# Patient Record
Sex: MALE | Race: WHITE | NOT HISPANIC OR LATINO
[De-identification: names, ages, dates, MRNs, and addresses within clinical notes are randomized per-mention and may not be internally consistent; named-entity substitution may affect disease eponyms.]

---

## 2017-03-23 ENCOUNTER — APPOINTMENT (OUTPATIENT)
Dept: SURGERY | Facility: CLINIC | Age: 52
End: 2017-03-23

## 2017-03-23 VITALS
SYSTOLIC BLOOD PRESSURE: 128 MMHG | HEART RATE: 62 BPM | HEIGHT: 72 IN | BODY MASS INDEX: 27.09 KG/M2 | WEIGHT: 200 LBS | DIASTOLIC BLOOD PRESSURE: 83 MMHG | TEMPERATURE: 98 F | OXYGEN SATURATION: 97 % | RESPIRATION RATE: 16 BRPM

## 2017-03-23 DIAGNOSIS — K40.20 BILATERAL INGUINAL HERNIA, W/OUT OBSTRUCTION OR GANGRENE, NOT SPECIFIED AS RECURRENT: ICD-10-CM

## 2017-03-23 DIAGNOSIS — K40.91 UNILATERAL INGUINAL HERNIA, W/OUT OBSTRUCTION OR GANGRENE, RECURRENT: ICD-10-CM

## 2018-05-03 ENCOUNTER — TRANSCRIPTION ENCOUNTER (OUTPATIENT)
Age: 53
End: 2018-05-03

## 2020-02-29 ENCOUNTER — EMERGENCY (EMERGENCY)
Facility: HOSPITAL | Age: 55
LOS: 0 days | Discharge: HOME | End: 2020-02-29
Attending: EMERGENCY MEDICINE
Payer: COMMERCIAL

## 2020-02-29 VITALS
OXYGEN SATURATION: 97 % | TEMPERATURE: 98 F | HEART RATE: 75 BPM | DIASTOLIC BLOOD PRESSURE: 89 MMHG | RESPIRATION RATE: 15 BRPM | SYSTOLIC BLOOD PRESSURE: 139 MMHG

## 2020-02-29 VITALS — WEIGHT: 205.03 LBS | HEIGHT: 72 IN

## 2020-02-29 DIAGNOSIS — H57.89 OTHER SPECIFIED DISORDERS OF EYE AND ADNEXA: ICD-10-CM

## 2020-02-29 DIAGNOSIS — H10.9 UNSPECIFIED CONJUNCTIVITIS: ICD-10-CM

## 2020-02-29 PROCEDURE — 99283 EMERGENCY DEPT VISIT LOW MDM: CPT

## 2020-02-29 RX ORDER — ERYTHROMYCIN BASE 5 MG/GRAM
1 OINTMENT (GRAM) OPHTHALMIC (EYE)
Qty: 1 | Refills: 0
Start: 2020-02-29 | End: 2020-03-04

## 2020-02-29 NOTE — ED ADULT TRIAGE NOTE - CHIEF COMPLAINT QUOTE
pt c/o discomfort and "redness to eyes," after applying VISINE to his eyes d/t dryness.  Denies double/blurry vision.

## 2020-02-29 NOTE — ED PROVIDER NOTE - OBJECTIVE STATEMENT
54 YOM with no pertinent PMHx presents to the ED c/o b/l eye redness onset x2 days ago. Pt was working in construction site 2 days ago and could have been exposed to something that may have irritated his eyes. +Clear discharge, which was crusty this morning. Denies thick exudate, pain, cough, decrease in vision. No recent travel. Pt is using Visine to treat eyes. Negative Slit-lamp exam. No dye uptake, no sign of corneal abrasion.

## 2020-02-29 NOTE — ED PROVIDER NOTE - NSFOLLOWUPCLINICS_GEN_ALL_ED_FT
[facility]  Ophthalmology  [address] [place] [address] [id number]  [place] [address]  Phone: [phone]  Fax:   Follow Up Time: 7-10 Days

## 2020-02-29 NOTE — ED PROVIDER NOTE - CLINICAL SUMMARY MEDICAL DECISION MAKING FREE TEXT BOX
Slit-lamp exam with fluorescein to r/o corneal abrasion. Topical Abx for suspected conjunctivitis. Follow-up with optometry.

## 2020-02-29 NOTE — ED PROVIDER NOTE - PATIENT PORTAL LINK FT
You can access the FollowMyHealth Patient Portal offered by [facility] by registering at the following website: [email or web address]. By joining [employer]’s FollowMyHealth portal, you will also be able to view your health information using other applications (apps) compatible with our system.

## 2022-06-07 PROBLEM — Z78.9 OTHER SPECIFIED HEALTH STATUS: Chronic | Status: ACTIVE | Noted: 2020-02-29

## 2022-06-13 ENCOUNTER — APPOINTMENT (OUTPATIENT)
Dept: OTOLARYNGOLOGY | Facility: CLINIC | Age: 57
End: 2022-06-13
Payer: MEDICAID

## 2022-06-13 VITALS
WEIGHT: 200 LBS | BODY MASS INDEX: 27.09 KG/M2 | DIASTOLIC BLOOD PRESSURE: 87 MMHG | SYSTOLIC BLOOD PRESSURE: 133 MMHG | HEART RATE: 80 BPM | HEIGHT: 72 IN

## 2022-06-13 DIAGNOSIS — J34.2 DEVIATED NASAL SEPTUM: ICD-10-CM

## 2022-06-13 DIAGNOSIS — J31.0 CHRONIC RHINITIS: ICD-10-CM

## 2022-06-13 DIAGNOSIS — T48.5X5A CHRONIC RHINITIS: ICD-10-CM

## 2022-06-13 DIAGNOSIS — R59.0 LOCALIZED ENLARGED LYMPH NODES: ICD-10-CM

## 2022-06-13 PROCEDURE — 99203 OFFICE O/P NEW LOW 30 MIN: CPT | Mod: 25

## 2022-06-13 PROCEDURE — 31231 NASAL ENDOSCOPY DX: CPT

## 2022-06-13 RX ORDER — PREDNISONE 20 MG/1
20 TABLET ORAL
Qty: 8 | Refills: 0 | Status: ACTIVE | COMMUNITY
Start: 2022-03-05

## 2022-06-13 RX ORDER — CEFDINIR 300 MG/1
300 CAPSULE ORAL
Qty: 20 | Refills: 0 | Status: ACTIVE | COMMUNITY
Start: 2022-03-16

## 2022-06-13 RX ORDER — AMOXICILLIN AND CLAVULANATE POTASSIUM 875; 125 MG/1; MG/1
875-125 TABLET, COATED ORAL
Qty: 20 | Refills: 0 | Status: ACTIVE | COMMUNITY
Start: 2022-03-05

## 2022-06-13 RX ORDER — FLUTICASONE PROPIONATE 50 UG/1
50 SPRAY, METERED NASAL
Qty: 16 | Refills: 0 | Status: ACTIVE | COMMUNITY
Start: 2022-03-17

## 2022-06-13 NOTE — REVIEW OF SYSTEMS
[Sneezing] : sneezing [Seasonal Allergies] : seasonal allergies [Post Nasal Drip] : post nasal drip [Ear Pain] : ear pain [Ear Itch] : ear itch [Vertigo] : vertigo [Nasal Congestion] : nasal congestion [Recurrent Sinus Infections] : recurrent sinus infections [Sinus Pain] : sinus pain [Sinus Pressure] : sinus pressure [Snoring With Pauses] : snoring with pauses [Negative] : Heme/Lymph [FreeTextEntry9] : muscle ache (after work out ) [de-identified] : hives

## 2022-06-13 NOTE — ASSESSMENT
[FreeTextEntry1] : Camilo Brunson presents for evaluation of chronic left nasal obstruction. He has significant leftward septal deviation on endoscopy. However, he also notes chronic afrin use with evidence of rhinitis medicamentosa on endoscopy. We will stop afrin and start topical nasal regimen as below. He has a mobile right cervical lymph node that has been present for years.. Flexible laryngoscopy shows no evidence of mass or lesion.\par \par - STOP afrin.\par - Start nasal saline sprays TID.\par - Start flonase 2 sprays BID.\par - US head and neck\par - Follow up in 1 month.

## 2022-06-13 NOTE — PHYSICAL EXAM
[Nasal Endoscopy Performed] : nasal endoscopy was performed, see procedure section for findings [] : septum deviated to the left [Midline] : trachea located in midline position [Normal] : no rashes [de-identified] : Significant congestion bilaterally. [Laryngoscopy Performed] : laryngoscopy was performed, see procedure section for findings [de-identified] : Enlarged 1.5 cm right level 2 lymph node, mobile, well circumscribed.

## 2022-06-13 NOTE — HISTORY OF PRESENT ILLNESS
[de-identified] : Camilo Brunson is a 57 yo male who presents for evaluation of left nasal obstruction. He notes that this has been present his whole life. He denies any preceding nasal trauma or fracture. He notes some rhinorrhea but denies postnasal drainage. He notes intermittent sinus pressure. He notes history of sinus infections, about twice per year. He denies chronic symptoms at baseline. He denies fevers, chills, vision changes. He notes history of allergy testing that was negative. He notes sneezing but denies epiphora. He uses afrin daily. He denies any history of nasal surgery.\par \par He notes intermittent bilateral aural discomfort. He denies otorrhea, tinnitus, or vertigo. He notes intermittent dysequilibrium. He denies hearing change. He also notes an enlarged right neck lymph node that is not painful and has been present for years. He denies change in size.

## 2022-06-16 ENCOUNTER — NON-APPOINTMENT (OUTPATIENT)
Age: 57
End: 2022-06-16

## 2022-06-23 DIAGNOSIS — R22.1 LOCALIZED SWELLING, MASS AND LUMP, NECK: ICD-10-CM

## 2023-01-09 ENCOUNTER — EMERGENCY (EMERGENCY)
Facility: HOSPITAL | Age: 58
LOS: 0 days | Discharge: HOME | End: 2023-01-10
Attending: EMERGENCY MEDICINE | Admitting: EMERGENCY MEDICINE
Payer: MEDICAID
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
## 2023-01-09 DIAGNOSIS — K13.0 DISEASES OF LIPS: ICD-10-CM

## 2023-01-09 DIAGNOSIS — Z91.010 ALLERGY TO PEANUTS: ICD-10-CM

## 2023-01-09 DIAGNOSIS — T78.3XXA ANGIONEUROTIC EDEMA, INITIAL ENCOUNTER: ICD-10-CM
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
## 2023-01-09 PROCEDURE — 99291 CRITICAL CARE FIRST HOUR: CPT

## 2023-01-10 VITALS
TEMPERATURE: 98 F | OXYGEN SATURATION: 98 % | HEART RATE: 77 BPM | DIASTOLIC BLOOD PRESSURE: 106 MMHG | RESPIRATION RATE: 18 BRPM | WEIGHT: 199.96 LBS | SYSTOLIC BLOOD PRESSURE: 170 MMHG

## 2023-01-10 LAB
ALBUMIN SERPL ELPH-MCNC: 4.7 G/DL — SIGNIFICANT CHANGE UP (ref 3.5–5.2)
ALP SERPL-CCNC: 82 U/L — SIGNIFICANT CHANGE UP (ref 30–115)
ALT FLD-CCNC: 15 U/L — SIGNIFICANT CHANGE UP (ref 0–41)
ANION GAP SERPL CALC-SCNC: 9 MMOL/L — SIGNIFICANT CHANGE UP (ref 7–14)
AST SERPL-CCNC: 22 U/L — SIGNIFICANT CHANGE UP (ref 0–41)
BASOPHILS # BLD AUTO: 0.02 K/UL — SIGNIFICANT CHANGE UP (ref 0–0.2)
BASOPHILS NFR BLD AUTO: 0.4 % — SIGNIFICANT CHANGE UP (ref 0–1)
BILIRUB SERPL-MCNC: 0.5 MG/DL — SIGNIFICANT CHANGE UP (ref 0.2–1.2)
BUN SERPL-MCNC: 18 MG/DL — SIGNIFICANT CHANGE UP (ref 10–20)
CALCIUM SERPL-MCNC: 9.3 MG/DL — SIGNIFICANT CHANGE UP (ref 8.4–10.5)
CHLORIDE SERPL-SCNC: 104 MMOL/L — SIGNIFICANT CHANGE UP (ref 98–110)
CO2 SERPL-SCNC: 27 MMOL/L — SIGNIFICANT CHANGE UP (ref 17–32)
CREAT SERPL-MCNC: 1 MG/DL — SIGNIFICANT CHANGE UP (ref 0.7–1.5)
EGFR: 87 ML/MIN/1.73M2 — SIGNIFICANT CHANGE UP
EOSINOPHIL # BLD AUTO: 0.12 K/UL — SIGNIFICANT CHANGE UP (ref 0–0.7)
EOSINOPHIL NFR BLD AUTO: 2.6 % — SIGNIFICANT CHANGE UP (ref 0–8)
GLUCOSE SERPL-MCNC: 94 MG/DL — SIGNIFICANT CHANGE UP (ref 70–99)
HCT VFR BLD CALC: 42.7 % — SIGNIFICANT CHANGE UP (ref 42–52)
HGB BLD-MCNC: 14.8 G/DL — SIGNIFICANT CHANGE UP (ref 14–18)
IMM GRANULOCYTES NFR BLD AUTO: 0.4 % — HIGH (ref 0.1–0.3)
LYMPHOCYTES # BLD AUTO: 1.37 K/UL — SIGNIFICANT CHANGE UP (ref 1.2–3.4)
LYMPHOCYTES # BLD AUTO: 29.3 % — SIGNIFICANT CHANGE UP (ref 20.5–51.1)
MAGNESIUM SERPL-MCNC: 2 MG/DL — SIGNIFICANT CHANGE UP (ref 1.8–2.4)
MCHC RBC-ENTMCNC: 27 PG — SIGNIFICANT CHANGE UP (ref 27–31)
MCHC RBC-ENTMCNC: 34.7 G/DL — SIGNIFICANT CHANGE UP (ref 32–37)
MCV RBC AUTO: 77.8 FL — LOW (ref 80–94)
MONOCYTES # BLD AUTO: 0.53 K/UL — SIGNIFICANT CHANGE UP (ref 0.1–0.6)
MONOCYTES NFR BLD AUTO: 11.3 % — HIGH (ref 1.7–9.3)
NEUTROPHILS # BLD AUTO: 2.62 K/UL — SIGNIFICANT CHANGE UP (ref 1.4–6.5)
NEUTROPHILS NFR BLD AUTO: 56 % — SIGNIFICANT CHANGE UP (ref 42.2–75.2)
NRBC # BLD: 0 /100 WBCS — SIGNIFICANT CHANGE UP (ref 0–0)
PLATELET # BLD AUTO: 180 K/UL — SIGNIFICANT CHANGE UP (ref 130–400)
POTASSIUM SERPL-MCNC: 4.1 MMOL/L — SIGNIFICANT CHANGE UP (ref 3.5–5)
POTASSIUM SERPL-SCNC: 4.1 MMOL/L — SIGNIFICANT CHANGE UP (ref 3.5–5)
PROT SERPL-MCNC: 6.9 G/DL — SIGNIFICANT CHANGE UP (ref 6–8)
RBC # BLD: 5.49 M/UL — SIGNIFICANT CHANGE UP (ref 4.7–6.1)
RBC # FLD: 12.4 % — SIGNIFICANT CHANGE UP (ref 11.5–14.5)
SODIUM SERPL-SCNC: 140 MMOL/L — SIGNIFICANT CHANGE UP (ref 135–146)
WBC # BLD: 4.68 K/UL — LOW (ref 4.8–10.8)
WBC # FLD AUTO: 4.68 K/UL — LOW (ref 4.8–10.8)

## 2023-01-10 PROCEDURE — 93010 ELECTROCARDIOGRAM REPORT: CPT

## 2023-01-10 RX ORDER — SODIUM CHLORIDE 9 MG/ML
1000 INJECTION, SOLUTION INTRAVENOUS ONCE
Refills: 0 | Status: COMPLETED | OUTPATIENT
Start: 2023-01-10 | End: 2023-01-10

## 2023-01-10 RX ORDER — DIPHENHYDRAMINE HCL 50 MG
50 CAPSULE ORAL ONCE
Refills: 0 | Status: COMPLETED | OUTPATIENT
Start: 2023-01-10 | End: 2023-01-10

## 2023-01-10 RX ORDER — DEXAMETHASONE 0.5 MG/5ML
10 ELIXIR ORAL ONCE
Refills: 0 | Status: COMPLETED | OUTPATIENT
Start: 2023-01-10 | End: 2023-01-10

## 2023-01-10 RX ADMIN — Medication 50 MILLIGRAM(S): at 00:32

## 2023-01-10 RX ADMIN — SODIUM CHLORIDE 1000 MILLILITER(S): 9 INJECTION, SOLUTION INTRAVENOUS at 00:32

## 2023-01-10 RX ADMIN — Medication 10 MILLIGRAM(S): at 00:32

## 2023-01-10 NOTE — ED PROVIDER NOTE - ATTENDING CONTRIBUTION TO CARE
pt w angioedema of lips.   given anti allergic tx in ED  no throat complaints, no airway compromise. no stridor. lungs clear. no ab pain/tender/distension.    will need close observation and supportive care

## 2023-01-10 NOTE — ED PROVIDER NOTE - PHYSICAL EXAMINATION
CONSTITUTIONAL: in no acute distress, afebrile  SKIN: Warm, dry  HEAD: lower lip swelling, localized to lip, no gingiva or neck or face involvement, no signs of airway compromise, uvula midline, bleeding, no TTP or swelling to floor of mouth  EYES: No conjunctival injection. EOMI. PERRLA  ENT: No nasal discharge; oropharynx nonerythematous; airway clear  NECK: Supple; non tender  CARD:  Regular rate and rhythm  RESP: CTAB; No wheezes, crackles, rales or rhonchi  ABD: Soft NTND; No guarding or rebound tenderness  EXT: Normal ROM.  No clubbing or cyanosis.  No edema. No calf tenderness  NEURO: A&O x3, grossly unremarkable, no focal deficits

## 2023-01-10 NOTE — ED PROVIDER NOTE - PROGRESS NOTE DETAILS
AH - Labs unremarkable, patient reassessed.  Patient stating he is feeling a little bit better, swelling slightly improved.  No signs of airway compromise.  Vital signs within normal limits.  Will reassess.

## 2023-01-10 NOTE — ED PROVIDER NOTE - PATIENT PORTAL LINK FT
You can access the FollowMyHealth Patient Portal offered by [facility] by registering at the following website: [email or web address]. By joining [employer]’s FollowMyHealth portal, you will also be able to view your health information using other applications (apps) compatible with our system.

## 2023-01-10 NOTE — ED PROVIDER NOTE - OBJECTIVE STATEMENT
57-year-old male with no significant PMH who presents with lip swelling since last night.  Worsening.  Patient had a similar episode 2 years ago, improved with steroids.  Patient does not take any medications, no ACE inhibitor's.  Patient thinks he is allergic to peanuts.  No peanut ingestion or exposure that he knows of.  Patient did not eat or drink anything different or unusual.  Patient has not tried any medication for relief.  Patient denies any fevers, chills, difficulty breathing, difficulty swallowing, nausea, vomiting, diarrhea, abdominal pain, neck pain, hearing changes, headache, focal weakness, dental procedures.

## 2023-01-10 NOTE — ED PROVIDER NOTE - NSFOLLOWUPINSTRUCTIONS_ED_ALL_ED_FT
- Please follow up with your Primary Care Physician  - You may take antihistamines as needed      Angioedema  Angioedema is the sudden swelling of tissue in the body. Angioedema can affect any part of the body, but it most often affects the deeper parts of the skin, causing red, itchy patches (hives) to appear over the affected area. It often begins during the night and is found in the morning.    Depending on the cause, angioedema may happen:    Only once.  Several times. It may come back in unpredictable patterns.  Repeatedly for several years. Over time, it may gradually stop coming back.    Angioedema can be life-threatening if it affects the air passages that you breathe through.    What are the causes?  This condition may be caused by:    Foods, such as milk, eggs, shellfish, wheat, or nuts.  Certain medicines, such as ACE inhibitors, antibiotics, nonsteroidal anti-inflammatory drugs, birth control pills, or dyes used in X-rays.  Insect stings.  Infections.    Angioedema can be inherited, and episodes can be triggered by:    Mild injury.  Dental work.  Surgery.  Stress.  Sudden changes in temperature.  Exercise.    In some cases, the cause of this condition is not known.    What are the signs or symptoms?  ImageSymptoms of this condition depend on where the swelling happens. Symptoms may include:    Swollen skin.  Red, itchy patches of skin (hives).  Redness in the affected area.  Pain in the affected area.  Swollen lips or tongue.  Wheezing.  Breathing problems.    If your internal organs are involved, symptoms may also include:    Nausea.  Abdominal pain.  Vomiting.  Difficulty swallowing.  Difficulty passing urine.    How is this diagnosed?  This condition may be diagnosed based on:    An exam of the affected area.  Your medical history.  Whether anyone in your family has had this condition before.  A review of any medicines you have been taking.  Tests, including:    Allergy skin tests to see if the condition was caused by an allergic reaction.  Blood tests to see if the condition was caused by a gene.  Tests to check for underlying diseases that could cause the condition.      How is this treated?  Treatment for this condition depends on the cause. It may involve any of the following:    If something triggered the condition, making changes to keep it from triggering the condition again.  If the condition affects your breathing, having tubes placed in your airway to keep it open.  Taking medicines to treat symptoms or prevent future episodes. These may include:    Antihistamines.  Epinephrine injections.  Steroids.      If your condition is severe, you may need to be treated at the hospital. Angioedema usually gets better in 24–48 hours.    Follow these instructions at home:  Take over-the-counter and prescription medicines only as told by your health care provider.  If you were given medicines for emergency allergy treatment, always carry them with you.  Wear a medical bracelet as told by your health care provider.  If something triggers your condition, avoid the trigger, if possible.  If your condition is inherited and you are thinking about having children, talk to your health care provider. It is important to discuss the risks of passing on the condition to your children.  Contact a health care provider if:  You have repeated episodes of angioedema.  Episodes of angioedema start to happen more often than they used to, even after you take steps to prevent them.  You have episodes of angioedema that are more severe than they have been before, even after you take steps to prevent them.  You are thinking about having children.  Get help right away if:  You have severe swelling of your mouth, tongue, or lips.  You have trouble breathing.  You have trouble swallowing.  You faint.  This information is not intended to replace advice given to you by your health care provider. Make sure you discuss any questions you have with your health care provider.
